# Patient Record
Sex: FEMALE | Race: WHITE | Employment: STUDENT | URBAN - METROPOLITAN AREA
[De-identification: names, ages, dates, MRNs, and addresses within clinical notes are randomized per-mention and may not be internally consistent; named-entity substitution may affect disease eponyms.]

---
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
## 2021-05-27 ENCOUNTER — HOSPITAL ENCOUNTER (EMERGENCY)
Facility: HOSPITAL | Age: 21
Discharge: HOME | End: 2021-05-27
Attending: EMERGENCY MEDICINE
Payer: COMMERCIAL

## 2021-05-27 VITALS
WEIGHT: 130 LBS | SYSTOLIC BLOOD PRESSURE: 138 MMHG | TEMPERATURE: 98 F | RESPIRATION RATE: 18 BRPM | BODY MASS INDEX: 24 KG/M2 | HEART RATE: 77 BPM | OXYGEN SATURATION: 97 % | DIASTOLIC BLOOD PRESSURE: 90 MMHG

## 2021-05-27 DIAGNOSIS — Z20.3 CONTACT WITH AND (SUSPECTED) EXPOSURE TO RABIES: Primary | ICD-10-CM

## 2021-05-27 PROCEDURE — 90471 IMMUNIZATION ADMIN: CPT

## 2021-05-27 PROCEDURE — 96372 THER/PROPH/DIAG INJ SC/IM: CPT

## 2021-05-27 PROCEDURE — 99284 EMERGENCY DEPT VISIT MOD MDM: CPT

## 2021-05-27 NOTE — ED INITIAL ASSESSMENT (HPI)
Found bat in house. Unsure how long bat had been in the house. Patient has no complaints at this time.

## 2021-05-29 NOTE — ED PROVIDER NOTES
Patient Seen in: [facility] AND [facility] Emergency Department    History   Patient presents with:  Bite    Stated Complaint: Unknown bat bite    HPI    Patient complains of bat exposure. No obvious bite, but was in home.   Aniya Meeks was caught and police submitted f above.    [facility] elements reviewed from today and agreed except as otherwise stated in HPI.     Physical Exam     ED Triage Vitals [05/27/21 1204]   /90   Pulse 77   Resp 18   Temp 98 °F (36.7 °C)   Temp src Temporal   SpO2 97 %   O2 Device None (Room a series      Medications Prescribed:  Discharge Medication List as of 5/27/2021  1:55 PM

## 2021-05-30 ENCOUNTER — HOSPITAL ENCOUNTER (OUTPATIENT)
Age: 21
Discharge: HOME | End: 2021-05-30
Payer: COMMERCIAL

## 2021-05-30 VITALS
OXYGEN SATURATION: 100 % | SYSTOLIC BLOOD PRESSURE: 116 MMHG | RESPIRATION RATE: 16 BRPM | HEART RATE: 83 BPM | TEMPERATURE: 98 F | DIASTOLIC BLOOD PRESSURE: 80 MMHG

## 2021-05-30 PROCEDURE — 90675 RABIES VACCINE IM: CPT

## 2021-05-30 PROCEDURE — 90471 IMMUNIZATION ADMIN: CPT

## 2021-05-30 NOTE — ED QUICK NOTES
Rabies vaccine administered pt tolerated well . Family at side. Pt discharged stable with family , pt denies any complaints. No signs of reaction.

## 2021-06-05 NOTE — ED NOTES
Called pt regarding day 7 rabies vaccine which was due 6/3/21. States bat tested neg for rabies and was told she can stop rabies vaccines by [facility].